# Patient Record
Sex: FEMALE | Race: WHITE | NOT HISPANIC OR LATINO | Employment: FULL TIME | URBAN - METROPOLITAN AREA
[De-identification: names, ages, dates, MRNs, and addresses within clinical notes are randomized per-mention and may not be internally consistent; named-entity substitution may affect disease eponyms.]

---

## 2019-06-19 ENCOUNTER — OFFICE VISIT (OUTPATIENT)
Dept: FAMILY MEDICINE | Facility: CLINIC | Age: 34
End: 2019-06-19
Payer: COMMERCIAL

## 2019-06-19 VITALS
HEART RATE: 106 BPM | OXYGEN SATURATION: 98 % | BODY MASS INDEX: 18.52 KG/M2 | HEIGHT: 67 IN | DIASTOLIC BLOOD PRESSURE: 79 MMHG | SYSTOLIC BLOOD PRESSURE: 117 MMHG | TEMPERATURE: 98.1 F | RESPIRATION RATE: 20 BRPM | WEIGHT: 118 LBS

## 2019-06-19 DIAGNOSIS — E03.4 HYPOTHYROIDISM DUE TO ACQUIRED ATROPHY OF THYROID: Primary | ICD-10-CM

## 2019-06-19 PROCEDURE — 99203 OFFICE O/P NEW LOW 30 MIN: CPT | Performed by: FAMILY MEDICINE

## 2019-06-19 RX ORDER — BUPROPION HYDROCHLORIDE 150 MG/1
150 TABLET ORAL EVERY MORNING
COMMUNITY
End: 2021-05-20

## 2019-06-19 RX ORDER — LEVOTHYROXINE SODIUM 75 UG/1
75 TABLET ORAL DAILY
COMMUNITY
End: 2019-06-19

## 2019-06-19 RX ORDER — LEVOTHYROXINE SODIUM 75 UG/1
75 TABLET ORAL DAILY
Qty: 90 TABLET | Refills: 3 | Status: SHIPPED | OUTPATIENT
Start: 2019-06-19 | End: 2020-06-08

## 2019-06-19 ASSESSMENT — PATIENT HEALTH QUESTIONNAIRE - PHQ9: SUM OF ALL RESPONSES TO PHQ QUESTIONS 1-9: 1

## 2019-06-19 ASSESSMENT — MIFFLIN-ST. JEOR: SCORE: 1259.93

## 2019-06-19 NOTE — PROGRESS NOTES
Subjective     Fara Mota is a 34 year old female who presents to clinic today for the following health issues:    HPI   New Patient/Transfer of Care  Medication Refills of Levothyroxine and Wellbutrin     Taking Medication as prescribed: yes    Side Effects:  Anxious--could it be related to medications or maybe just overall stress of moving     Medication Helping Symptoms:  Would like to discuss coming off of Wellbutrin      Has been living in [place]  Has had PP depression and has thyroid condition   needs refills    Had hyperthyroid initially then swung into hypo  Hs been on 50 mcg thyroid hormone and recently increased to 75  - last thyroid levels were may and ere at goal (roughly level of 2)    PHQ-9 SCORE 6/19/2019   PHQ-9 Total Score 1     Taking the levo for her mood as well  Thinks the buproprion is making her anxious it has helped with mood and depression  Has a network here feels this should help  She would like stop buproprion    UTD with other well adult screens        Patient Active Problem List   Diagnosis     CARDIOVASCULAR SCREENING; LDL GOAL LESS THAN 160     Postpartum depression     Hypothyroidism due to acquired atrophy of thyroid     No past surgical history on file.    Social History     Tobacco Use     Smoking status: Never Smoker     Smokeless tobacco: Never Used   Substance Use Topics     Alcohol use: Yes     Family History   Problem Relation Age of Onset     Hypertension Father      Cancer Maternal Grandfather      Diabetes Maternal Grandfather      Hypertension Sister          Current Outpatient Medications   Medication Sig Dispense Refill     buPROPion (WELLBUTRIN XL) 150 MG 24 hr tablet Take 150 mg by mouth every morning       levothyroxine (SYNTHROID/LEVOTHROID) 75 MCG tablet Take 1 tablet (75 mcg) by mouth daily 90 tablet 3     amoxicillin (AMOXIL) 500 MG capsule Take 1 capsule by mouth 3 times daily. (Patient not taking: Reported on 6/19/2019) 30 capsule 0         Reviewed and updated  "as needed this visit by Provider         Review of Systems   ROS COMP: Constitutional, HEENT, cardiovascular, pulmonary, gi and gu systems are negative, except as otherwise noted.      Objective    /79 (BP Location: Right arm, Patient Position: Sitting, Cuff Size: Adult Regular)   Pulse 106   Temp 98.1  F (36.7  C) (Oral)   Resp 20   Ht 1.689 m (5' 6.5\")   Wt 53.5 kg (118 lb)   SpO2 98%   Breastfeeding? No   BMI 18.76 kg/m    Body mass index is 18.76 kg/m .  Physical Exam   GENERAL: healthy, alert and no distress  NECK: no adenopathy, no asymmetry, masses, or scars and thyroid normal to palpation  CV: regular rate and rhythm, normal S1 S2, no S3 or S4, no murmur, click or rub, no peripheral edema and peripheral pulses strong  PSYCH: mentation appears normal, affect normal/bright    Diagnostic Test Results:  none         Assessment & Plan     (E03.4) Hypothyroidism due to acquired atrophy of thyroid  (primary encounter diagnosis)  Comment: refilled meds  XD Nutritionhck labs in a year  Plan: levothyroxine (SYNTHROID/LEVOTHROID) 75 MCG         tablet, TSH with free T4 reflex            (O99.345,  F53.0) Postpartum depression  Comment: encouraged she can stop wellbutrin if she would like - discussed that she can stop this quite easily - reviewed ways to do this  If symptoms return can consider another medication  Is sensitive to SAD - encouraged visit in mid fall to help fight this  Plan:        See Patient Instructions    No follow-ups on file.    Total time was over 30 minutes with >50% spent in counseling   Anna Marie Willis MD  [facility]        "

## 2019-06-19 NOTE — NURSING NOTE
"Chief Complaint   Patient presents with     Establish Care     Medication Request     /79 (BP Location: Right arm, Patient Position: Sitting, Cuff Size: Adult Regular)   Pulse 106   Temp 98.1  F (36.7  C) (Oral)   Resp 20   Ht 1.689 m (5' 6.5\")   Wt 53.5 kg (118 lb)   SpO2 98%   Breastfeeding? No   BMI 18.76 kg/m   Estimated body mass index is 18.76 kg/m  as calculated from the following:    Height as of this encounter: 1.689 m (5' 6.5\").    Weight as of this encounter: 53.5 kg (118 lb).  bp completed using cuff size: regular       Health Maintenance addressed:  Pap Smear    Completing KAREN.    Crow Alexis MA     "

## 2019-10-17 ENCOUNTER — TELEPHONE (OUTPATIENT)
Dept: FAMILY MEDICINE | Facility: CLINIC | Age: 34
End: 2019-10-17

## 2019-10-17 DIAGNOSIS — E03.4 HYPOTHYROIDISM DUE TO ACQUIRED ATROPHY OF THYROID: Primary | ICD-10-CM

## 2019-10-17 RX ORDER — LEVOTHYROXINE SODIUM 75 UG/1
75 TABLET ORAL DAILY
Qty: 4 TABLET | Refills: 0 | Status: SHIPPED | OUTPATIENT
Start: 2019-10-17 | End: 2020-05-30

## 2019-10-17 NOTE — TELEPHONE ENCOUNTER
Reason for Call:  Medication or medication refill:    Do you use a [facility]?  Name of the pharmacy and phone number for the current request:    [facility] in [place] if possible    Name of the medication requested:  levothyroxine (SYNTHROID/LEVOTHROID) 75 MCG tablet [[id number]] (Order [id number]  Other request:   Pt forgot to pack meds for vacation and would like a short term Rx sent so she can have meds. Trip is for 4 more days.     Can we leave a detailed message on this number? YES    Phone number patient can be reached at: Cell number on file:    Telephone Information:   Mobile [phone]       Best Time: any      Call taken on 10/17/2019 at 12:54 PM by Tyree Forrester

## 2019-10-17 NOTE — TELEPHONE ENCOUNTER
Patient informed.  Driving to [place].  Will leave [facility] on her voice mail per patient request.  Clara Adams RN

## 2019-11-09 ENCOUNTER — HEALTH MAINTENANCE LETTER (OUTPATIENT)
Age: 34
End: 2019-11-09

## 2020-01-13 ENCOUNTER — TELEPHONE (OUTPATIENT)
Dept: FAMILY MEDICINE | Facility: CLINIC | Age: 35
End: 2020-01-13

## 2020-01-13 NOTE — TELEPHONE ENCOUNTER
Reason for Call:  Medication or medication refill:    Do you use a [facility]?  Name of the pharmacy and phone number for the current request:     [id number] - [place], [place] - [facility] [address]      Name of the medication requested: Tamaflu    Other request:  and kids have the flu    Can we leave a detailed message on this number? YES    Phone number patient can be reached at: Home number on file [phone] (home)    Best Time: any    Call taken on 1/13/2020 at 3:57 PM by Thelma Gold    
Son has influenza B - diagnosed 1 week ago   just diagnosed with influenza B as well   Pt states she kisses her   Worried now that she may get flu  Pt doesn't have symptoms at this time  Not a lot of family support in [place]  She said if she gets the flu she isn't sure who will take care of the kids    Reviewed Tamiflu protocol   Patient is not high risk d/t dx, meds, etc  Advised monitoring for symptoms  If gets flu - symptomatic treatment    Pt ok with this plan  Andria BRYSON RN      
normal (ped)...

## 2020-02-23 ENCOUNTER — HEALTH MAINTENANCE LETTER (OUTPATIENT)
Age: 35
End: 2020-02-23

## 2020-03-16 ENCOUNTER — TELEPHONE (OUTPATIENT)
Dept: FAMILY MEDICINE | Facility: CLINIC | Age: 35
End: 2020-03-16

## 2020-03-16 NOTE — TELEPHONE ENCOUNTER
Reason for Call:  Same Day Appointment, Requested Provider:  Anna Marie Willis MD    PCP: Anna Marie Willis    Reason for visit: thyroid check    Duration of symptoms: na    Have you been treated for this in the past? Yes    Additional comments: pt wants to put off this visit due to covid and school cancellation but doesn't know how long she can do that/what would be appropriate    Can we leave a detailed message on this number? YES    Phone number patient can be reached at: Cell number on file:    Telephone Information:   Mobile [phone]       Best Time: any    Call taken on 3/16/2020 at 8:12 AM by Tyree Forrester

## 2020-03-16 NOTE — TELEPHONE ENCOUNTER
Last TSH 5/28/2019 - normal   Last OV 6/19/2019    Left detailed VM for pt   Schedule appt for June 2020 and hopefully things better by then re: Covid 19    If needs refill call to pharmacy or call us back  Andria BRYSON RN

## 2020-04-15 ENCOUNTER — VIRTUAL VISIT (OUTPATIENT)
Dept: FAMILY MEDICINE | Facility: OTHER | Age: 35
End: 2020-04-15

## 2020-04-15 NOTE — PROGRESS NOTES
"Date: 04/15/2020 10:00:43  Clinician: Anna Marie Otto  Clinician NPI: [id number]  Patient: Fara Darling  Patient : 1985  Patient Address: [address] [place] [address]  Patient Phone: [phone]  Visit Protocol: URI  Patient Summary:  Fara is a 35 year old ( : 1985 ) female who initiated a Visit for COVID-19 (Coronavirus) evaluation and screening. When asked the question \"Please sign me up to receive news, health information and promotions. \", Fara responded \"No\".    Fara states her symptoms started 1-2 days ago.   Her symptoms consist of a sore throat, a cough, and malaise.   Symptom details     Cough: Fara coughs a few times an hour and her cough is not more bothersome at night. Phlegm comes into her throat when she coughs. She does not believe her cough is caused by post-nasal drip. The color of the phlegm is white.     Sore throat: Fara reports having mild throat pain (1-3 on a 10 point pain scale), does not have exudate on her tonsils, and can swallow liquids. The lymph nodes in her neck are not enlarged. A rash has not appeared on the skin since the sore throat started.      Fara denies having rhinitis, enlarged lymph nodes, chills, diarrhea, facial pain or pressure, myalgias, vomiting, nausea, teeth pain, headache, wheezing, nasal congestion, ear pain, and fever. She also denies taking antibiotic medication for the symptoms and having recent facial or sinus surgery in the past 60 days. She is not experiencing dyspnea.   Precipitating events  Within the past week, Fara has not been exposed to someone with strep throat. She has not recently been exposed to someone with influenza. Fara has been in close contact with the following high risk individuals: children under the age of 5.   Pertinent COVID-19 (Coronavirus) information  Fara has not traveled internationally or to the areas where COVID-19 (Coronavirus) is widespread, including cruise ship travel in the last " 14 days before the start of her symptoms.   Fara does not work or volunteer as healthcare worker or a  and does not work or volunteer in a healthcare facility.   She does not live with a healthcare worker.   Fara has not had a close contact with a laboratory-confirmed COVID-19 patient within 14 days of symptom onset. She also has not had a close contact with a suspected COVID-19 patient within 14 days of symptom onset.   Pertinent medical history  Fara does not get yeast infections when she takes antibiotics.   Fara needs a return to work/school note.   Weight: 130 lbs   Fara does not smoke or use smokeless tobacco.   She denies pregnancy and denies breastfeeding. She has menstruated in the past month.   Additional information as reported by the patient (free text): chest is tight.  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 This illness can cause fever, cough and trouble breathing. Many people get a mild case and get better on their own. Some people can get very sick. At this time, it is difficult to distinguish between COVID-19 and the common cold given your symptoms. You can continue to perform supportive cares by using Robitussin or Mucinex to help with the cough. Also, you can use honey in warm tea to help soothe your throat. I will sign you up for the [employer] Loop and they will check in on you daily.&nbsp;  Will I be tested for COVID-19?  Because the virus is spreading, we are no longer testing most patients. You may request testing if:   You are very ill. For example, you're on chemotherapy, dialysis or home hospice care. (Contact your specialty clinic or program.)   You live in a nursing home or other long-term care facility. (Talk to your nurse manager or medical  director.)   You're a health care worker. (Contact your employee health office.)   How can I protect others?  Without a test, we can't know for sure that you have COVID-19. For safety, it's very important to follow these rules.  First, stay home and away from others (self-isolate) until:   You've had no fever---and no medicine that reduces fever---for 3 full days (72 hours). And...    Your other symptoms have gotten better. For example, your cough or breathing has improved. And...   At least 7 days have passed since your symptoms started.   During this time:   Don't go to work, school or anywhere else.    Stay away from others in your home. No hugging, kissing or shaking hands.   Don't let anyone visit.   Cover your mouth and nose with a mask, tissue or wash cloth to avoid spreading germs.   Wash your hands and face often. Use soap and water.   How can I take care of myself?   1.Take Tylenol (acetaminophen) for fever or pain. If you have liver or kidney problems, ask your family doctor if it's okay to take Tylenol.        Adults can take either:    650 mg (two 325 mg pills) every 4 to 6 hours, or...   1,000 mg (two 500 mg pills) every 8 hours as needed.    Note: Don't take more than 3,000 mg in one day.   For children, check the Tylenol bottle for the right dose. The dose is based on the child's age or weight.   2.If you have other health problems (like cancer, heart failure, an organ transplant or severe kidney disease): Call your specialty clinic if you don't feel better in the next 2 days.       3.Know when to call 911: If your breathing is so bad that it keeps you from doing normal activities, call 911 or go to the emergency room. Tell them that you've been staying home and may have COVID-19.       4.Sign up for [employer]. We know it's scary to hear that you might have COVID-19. We want to track your symptoms to make sure you're okay over the next 2 weeks. Please look for an email from [employer]---this is  a free, online program that we'll use to keep in touch. To sign up, follow the link in the email. Learn more at [email or web address].   Where can I get more information?  To learn more about COVID-19 and how to care for yourself at home, please visit the CDC website at https://www.cdc.gov/coronavirus/2019-ncov/about/steps-when-sick.html.  For more options for care at [facility], please visit our website at [email or web address]     Diagnosis: Cough  Diagnosis ICD: R05  Addendum created: April 15 12:10:22, 2020 created by: Raoul Ferreira body: I reviewed the e-visit and discussed the patient with the resident and agree with the resident's assessment and plan of care as documented.

## 2020-05-29 DIAGNOSIS — E03.4 HYPOTHYROIDISM DUE TO ACQUIRED ATROPHY OF THYROID: ICD-10-CM

## 2020-05-30 RX ORDER — LEVOTHYROXINE SODIUM 75 UG/1
TABLET ORAL
Qty: 7 TABLET | Refills: 0 | Status: SHIPPED | OUTPATIENT
Start: 2020-05-30 | End: 2021-05-20

## 2020-05-30 NOTE — TELEPHONE ENCOUNTER
Clinic Action Needed:No  Reason for Call: Fara is on her way to [place], [place] and she left her levothyroxine at home.  She is requesting a 4 day fill on her medications.  Called [facility] in [place], [place] and they will be able to fill from Rx sent to [facility] in [place], [place] on [address].    Paged on call provider for [facility] to speak to me at [facility].  Dr. Ontiveros is on call.  He gave verbal for 7 day supply.  Ordered Levothyroxine 75 mcg tablet, take one by mouth daily.  7 pills, no refills and sent to [facility] in [place] on [address], with instructions to hold Rx, that [facility] will fill.      Notified caller that Rx was sent to local pharmacy, have [facility] fill from that Rx.    Routed to: Not routed.    Reina Romero RN  [facility]

## 2020-06-05 DIAGNOSIS — E03.4 HYPOTHYROIDISM DUE TO ACQUIRED ATROPHY OF THYROID: ICD-10-CM

## 2020-06-05 NOTE — TELEPHONE ENCOUNTER
"Requested Prescriptions   Pending Prescriptions Disp Refills     levothyroxine (SYNTHROID/LEVOTHROID) 75 MCG tablet  Last Written Prescription Date:  5/30/2020  Last Fill Quantity: 7 tablet,  # refills: 0   Last office visit: 6/19/2019 with prescribing provider:  Lazaro   Future Office Visit:     90 tablet 3     Sig: Take 1 tablet (75 mcg) by mouth daily       Thyroid Protocol Failed - 6/5/2020  2:45 PM        Failed - Normal TSH on file in past 12 months     No lab results found.           Passed - Patient is 12 years or older        Passed - Recent (12 mo) or future (30 days) visit within the authorizing provider's specialty     Patient has had an office visit with the authorizing provider or a provider within the authorizing providers department within the previous 12 mos or has a future within next 30 days. See \"Patient Info\" tab in inbasket, or \"Choose Columns\" in Meds & Orders section of the refill encounter.              Passed - Medication is active on med list        Passed - No active pregnancy on record     If patient is pregnant or has had a positive pregnancy test, please check TSH.          Passed - No positive pregnancy test in past 12 months     If patient is pregnant or has had a positive pregnancy test, please check TSH.                "

## 2020-06-08 RX ORDER — LEVOTHYROXINE SODIUM 75 UG/1
75 TABLET ORAL DAILY
Qty: 30 TABLET | Refills: 0 | Status: SHIPPED | OUTPATIENT
Start: 2020-06-08 | End: 2020-07-26

## 2020-06-08 NOTE — TELEPHONE ENCOUNTER
Medication is being filled for 1 time refill only due to:  due for virtual visit this month   Andria BRYSON RN

## 2020-07-10 DIAGNOSIS — Z11.59 SCREENING FOR VIRAL DISEASE: ICD-10-CM

## 2020-07-16 ENCOUNTER — OFFICE VISIT (OUTPATIENT)
Dept: FAMILY MEDICINE | Facility: CLINIC | Age: 35
End: 2020-07-16
Payer: COMMERCIAL

## 2020-07-16 VITALS
OXYGEN SATURATION: 98 % | HEART RATE: 85 BPM | DIASTOLIC BLOOD PRESSURE: 81 MMHG | TEMPERATURE: 98.4 F | WEIGHT: 115.1 LBS | BODY MASS INDEX: 18.3 KG/M2 | SYSTOLIC BLOOD PRESSURE: 115 MMHG

## 2020-07-16 DIAGNOSIS — L70.0 ACNE VULGARIS: ICD-10-CM

## 2020-07-16 DIAGNOSIS — Z20.822 SUSPECTED COVID-19 VIRUS INFECTION: ICD-10-CM

## 2020-07-16 DIAGNOSIS — E03.4 HYPOTHYROIDISM DUE TO ACQUIRED ATROPHY OF THYROID: Primary | ICD-10-CM

## 2020-07-16 PROCEDURE — 84443 ASSAY THYROID STIM HORMONE: CPT | Performed by: FAMILY MEDICINE

## 2020-07-16 PROCEDURE — 99214 OFFICE O/P EST MOD 30 MIN: CPT | Performed by: FAMILY MEDICINE

## 2020-07-16 PROCEDURE — 36415 COLL VENOUS BLD VENIPUNCTURE: CPT | Performed by: FAMILY MEDICINE

## 2020-07-16 RX ORDER — CLINDAMYCIN PHOSPHATE 11.9 MG/ML
SOLUTION TOPICAL 2 TIMES DAILY
Qty: 60 ML | Refills: 3 | Status: SHIPPED | OUTPATIENT
Start: 2020-07-16 | End: 2021-04-15

## 2020-07-16 RX ORDER — ADAPALENE 0.1 G/100G
CREAM TOPICAL AT BEDTIME
Qty: 45 G | Refills: 3 | Status: SHIPPED | OUTPATIENT
Start: 2020-07-16

## 2020-07-16 NOTE — PROGRESS NOTES
Subjective     Fara Darling is a 35 year old female who presents to clinic today for the following health issues:    HPI       Hypothyroidism Follow-up    Since last visit, patient describes the following symptoms: weight loss of 12 lbs and hair loss, patient did just start new diet    How many servings of fruits and vegetables do you eat daily?  4 or more    On average, how many sweetened beverages do you drink each day (Examples: soda, juice, sweet tea, etc.  Do NOT count diet or artificially sweetened beverages)?   0    How many days per week do you exercise enough to make your heart beat faster? 3 or less    How many minutes a day do you exercise enough to make your heart beat faster? 30 - 60    How many days per week do you miss taking your medication? 0        Patient Active Problem List   Diagnosis     CARDIOVASCULAR SCREENING; LDL GOAL LESS THAN 160     Postpartum depression     Hypothyroidism due to acquired atrophy of thyroid     History reviewed. No pertinent surgical history.    Social History     Tobacco Use     Smoking status: Never Smoker     Smokeless tobacco: Never Used   Substance Use Topics     Alcohol use: Yes     Family History   Problem Relation Age of Onset     Hypertension Father      Cancer Maternal Grandfather      Diabetes Maternal Grandfather      Hypertension Sister          Current Outpatient Medications   Medication Sig Dispense Refill     adapalene (DIFFERIN) 0.1 % external cream Apply topically At Bedtime 45 g 3     buPROPion (WELLBUTRIN XL) 150 MG 24 hr tablet Take 150 mg by mouth every morning       clindamycin (CLEOCIN T) 1 % external solution Apply topically 2 times daily 60 mL 3     levothyroxine (SYNTHROID/LEVOTHROID) 75 MCG tablet TAKE 1 TABLET BY MOUTH EVERY DAY 7 tablet 0     levothyroxine (SYNTHROID/LEVOTHROID) 75 MCG tablet Take 1 tablet (75 mcg) by mouth daily 30 tablet 0       Reviewed and updated as needed this visit by Provider  Tobacco  Allergies  Meds   Problems  Med Hx  Surg Hx  Fam Hx       Has lost weight  denies palpitations  Heat intolerance    Changed her diet and lost 12 # since April  Quinoa vegetavlesnd fruit    Was hoping to stop her acne  Has not had any meds for this  Has not seen derm or pCP  Has had acne intermittenly last several years    Review of Systems   Constitutional, HEENT, cardiovascular, pulmonary, gi and gu systems are negative, except as otherwise noted.      Objective    /81   Pulse 85   Temp 98.4  F (36.9  C) (Oral)   Wt 52.2 kg (115 lb 1.6 oz)   LMP 06/19/2020   SpO2 98%   BMI 18.30 kg/m    Body mass index is 18.3 kg/m .  Physical Exam   GENERAL: healthy, alert and no distress  NECK: no adenopathy, no asymmetry, masses, or scars and thyroid normal to palpation  RESP: lungs clear to auscultation - no rales, rhonchi or wheezes  CV: regular rate and rhythm, normal S1 S2, no S3 or S4, no murmur, click or rub, no peripheral edema and peripheral pulses strong  SKIN: scattered acne along chin  Diagnostic Test Results:  labs pending        Assessment & Plan     1. Suspected COVID-19 virus infection  Plans for this in future  - COVID-19 Virus (Coronavirus) Antibody & Titer Reflex; Future    2. Acne vulgaris  Trial of medications as noted  - clindamycin (CLEOCIN T) 1 % external solution; Apply topically 2 times daily  Dispense: 60 mL; Refill: 3  - adapalene (DIFFERIN) 0.1 % external cream; Apply topically At Bedtime  Dispense: 45 g; Refill: 3    3. Hypothyroidism due to acquired atrophy of thyroid  recheck labs given weight loss  - TSH with free T4 reflex       See Patient Instructions    No follow-ups on file.    Anna Marie Willis MD  [facility]

## 2020-07-17 LAB — TSH SERPL DL<=0.005 MIU/L-ACNC: 1.5 MU/L (ref 0.4–4)

## 2020-07-18 DIAGNOSIS — Z11.59 SCREENING FOR VIRAL DISEASE: ICD-10-CM

## 2020-07-18 DIAGNOSIS — Z20.822 SUSPECTED COVID-19 VIRUS INFECTION: ICD-10-CM

## 2020-07-18 PROCEDURE — 86769 SARS-COV-2 COVID-19 ANTIBODY: CPT | Mod: 90 | Performed by: FAMILY MEDICINE

## 2020-07-18 PROCEDURE — 99000 SPECIMEN HANDLING OFFICE-LAB: CPT | Performed by: FAMILY MEDICINE

## 2020-07-18 PROCEDURE — 36415 COLL VENOUS BLD VENIPUNCTURE: CPT | Performed by: FAMILY MEDICINE

## 2020-07-18 NOTE — LETTER
July 22, 2020        Fara Darling  [address]  [facility] [address]      COVID-19 Antibody Screen   Date Value Ref Range Status   07/18/2020 Negative  Final     Comment:     No COVID-19 antibodies detected.  Patients within 10 days of symptom onset for   COVID-19 may not produce sufficient levels of detectable antibodies.    Immunocompromised COVID-19 patients may take longer to develop antibodies.       COVID-19 Antibody, IgG Titer   Date Value Ref Range Status   07/18/2020 Not Applicable  Final     Comment:     Qualitative screen for total antibodies to COVID-19 (SARS-CoV-2) with   semi-quantitative measurement of IgG COVID-19 antibodies by endpoint titer.    COVID-19 antibodies may be elevated due to a past or current infection.  Negative results do not rule out COVID-19 infection.  Results from antibody   testing should not be used as the sole basis to diagnose or exclude SARS-CoV-2   infection or to inform infection status.  COVID-19 PCR test should be ordered   if current infection is suspected.  False positive results may occur in rare   cases due to cross-reacting antibodies.  This test was developed and its performance characteristics determined by the   [facility] ([facility]),   which is regulated under CLIA as qualified to perform high-complexity testing.    This test has not been reviewed by the FDA.  Testing performed by [facility], [facility], [address], [address], [place] [address]         No results found for: COVAB      You have tested NEGATIVE for COVID-19 antibodies. This suggests you have not had or been exposed to COVID-19. But it does not mean that for sure.     The test finds antibodies in most people 10 days after they get sick. For some people, it takes longer than 10 days for antibodies to show up. Others may never show antibodies against COVID-19, especially if they  have weak immune systems.    If you have COVID-19 symptoms now, please stay home and away from others.     What is antibody testing?    This is a kind of blood test. We take a small sample of your blood, and then test it for something called  antibodies.      Your body makes antibodies to fight infection. If your blood has antibodies for a certain germ, it means you ve been infected with that germ in the past.     Sometimes, antibodies stay in your body for years after you ve had the infection. They can be there even if the germ didn t make you sick. They are a sign that your body fought off the infection.    Will this test find antibodies in everyone who s had COVID-19?    No. The test finds antibodies in most people 10 days after they get sick. For some people, it takes longer than 10 days for antibodies to show up. Others may never show antibodies against COVID-19, especially if they have weak immune systems.    What does it mean if the test finds COVID-19 antibodies?    If we find these antibodies, it suggests:     This person has had the virus.     Their body s immune system fought the virus.     We don t know if this will help protect someone from getting COVID-19 again. Scientists are still learning about this.    What are the signs of COVID-19?    Signs of COVID-19 can appear from 2 to 14 days (up to 2 weeks) after you re infected. Some people have no symptoms or only mild symptoms. Others get very sick. The most common symptoms are:          Cough    Shortness of breath or trouble breathing  Or at least 2 of these symptoms:    Fever    Chills    Repeated shaking with chills    Muscle pain    Headache    Sore throat    Losing your sense of taste or smell    You may have other symptoms. Please contact your doctor or clinic for any symptoms that worry you.    Where can I get more information?     To learn the [place] s guidelines for staying home, please visit the [facility] website at  https://www.health.state.[place]/diseases/coronavirus/basics.html    To learn more about COVID-19 and how to care for yourself at home, please visit the CDC website at https://www.cdc.gov/coronavirus/2019-ncov/about/steps-when-sick.html    For more options for care at [facility], please visit our website at [email or web address]    [place] [facility] ([facility]) COVID-19 Hotline:  [phone]

## 2020-07-21 LAB
COVID-19 SPIKE RBD ABY TITER: NORMAL
COVID-19 SPIKE RBD ABY: NEGATIVE

## 2020-07-26 DIAGNOSIS — E03.4 HYPOTHYROIDISM DUE TO ACQUIRED ATROPHY OF THYROID: ICD-10-CM

## 2020-07-26 RX ORDER — LEVOTHYROXINE SODIUM 75 UG/1
75 TABLET ORAL DAILY
Qty: 90 TABLET | Refills: 3 | Status: SHIPPED | OUTPATIENT
Start: 2020-07-26 | End: 2021-05-20

## 2020-07-29 ENCOUNTER — MYC MEDICAL ADVICE (OUTPATIENT)
Dept: FAMILY MEDICINE | Facility: CLINIC | Age: 35
End: 2020-07-29

## 2020-12-06 ENCOUNTER — HEALTH MAINTENANCE LETTER (OUTPATIENT)
Age: 35
End: 2020-12-06

## 2021-04-15 ENCOUNTER — OFFICE VISIT (OUTPATIENT)
Dept: DERMATOLOGY | Facility: CLINIC | Age: 36
End: 2021-04-15
Payer: COMMERCIAL

## 2021-04-15 DIAGNOSIS — Z12.83 SKIN CANCER SCREENING: Primary | ICD-10-CM

## 2021-04-15 DIAGNOSIS — D48.5 NEOPLASM OF UNCERTAIN BEHAVIOR OF SKIN: ICD-10-CM

## 2021-04-15 DIAGNOSIS — D22.9 MULTIPLE BENIGN NEVI: ICD-10-CM

## 2021-04-15 DIAGNOSIS — L70.0 ACNE VULGARIS: ICD-10-CM

## 2021-04-15 PROCEDURE — 99203 OFFICE O/P NEW LOW 30 MIN: CPT | Mod: 25 | Performed by: PHYSICIAN ASSISTANT

## 2021-04-15 PROCEDURE — 11102 TANGNTL BX SKIN SINGLE LES: CPT | Performed by: PHYSICIAN ASSISTANT

## 2021-04-15 PROCEDURE — 88305 TISSUE EXAM BY PATHOLOGIST: CPT | Performed by: DERMATOLOGY

## 2021-04-15 RX ORDER — LIDOCAINE HYDROCHLORIDE AND EPINEPHRINE 10; 10 MG/ML; UG/ML
3 INJECTION, SOLUTION INFILTRATION; PERINEURAL ONCE
Status: ACTIVE | OUTPATIENT
Start: 2021-04-15

## 2021-04-15 RX ORDER — DAPSONE 75 MG/G
GEL TOPICAL DAILY
Qty: 90 G | Refills: 1 | Status: SHIPPED | OUTPATIENT
Start: 2021-04-15

## 2021-04-15 ASSESSMENT — PAIN SCALES - GENERAL: PAINLEVEL: NO PAIN (0)

## 2021-04-15 NOTE — NURSING NOTE
Dermatology Rooming Note    Kaitlyn Darling's goals for this visit include:   Chief Complaint   Patient presents with     Skin Check     kaitlyn is coming in today for a skin check, states that her last check was 4 years ago, states that she has no spots of concern today, would also like to discuss acne, states it has gotten worse     Carolann Campuzano CMA on 4/15/2021 at 11:52 AM

## 2021-04-15 NOTE — PROGRESS NOTES
[facility] Dermatology Note  Encounter Date: Apr 15, 2021  Office Visit      Dermatology Problem List:  1. Acne - adapalene 0.1% cream nightly, Aczone 7.5% gel QAM  2. NUB - R medial calf - s/p bx 4/15/21    Social:  Two children. Grew up in [place], spent a lot of time in sun, recalls numerous sunburns.  ____________________________________________    Assessment & Plan:  # Skin Cancer Screening?multiple Benign Nevi.   - ABCDEs: Counseled ABCDEs of melanoma: Asymmetry, Border (irregularity), Color (not uniform, changes in color), Diameter (greater than 6 mm which is about the size of a pencil eraser), and Evolving (any changes in preexisting moles).  - Monitor: Patient to continue monitoring at home and will contact the clinic for any changes.  - Sun protection: Counseled SPF30+ sunscreen, UPF clothing, sun avoidance, tanning bed avoidance.     # Acne vulgaris. Likely hormonal component, but wants to avoid oral medication right now  - continue adapalene 0.1% cream nightly  - start dapsone 7.5% gel QAM    # Neoplasm of uncertain behavior on the R medial calf. The differential diagnosis includes DN vs other.   - Shave biopsy performed today (see procedure note(s) below).     Procedures Performed:   - Shave biopsy procedure note, location(s): R medial calf. After discussion of benefits and risks including but not limited to bleeding, infection, scar, incomplete removal, recurrence, and non-diagnostic biopsy, written consent and photographs were obtained. The area was cleaned with isopropyl alcohol. 0.5mL of 1% lidocaine with epinephrine was injected to obtain adequate anesthesia of lesion(s). Shave biopsy at site(s) performed. Hemostasis was achieved with aluminium chloride. Petrolatum ointment and a sterile dressing were applied. The patient tolerated the procedure and no complications were noted. The patient was provided with verbal and written post care instructions.      Follow-up: 1 year(s)  in-person, or earlier for new or changing lesions    Staff:     All risks, benefits and alternatives were discussed with patient.  Patient is in agreement and understands the assessment and plan.  All questions were answered.    Caryl Arreola PA-C, MPAS  [facility] [place]: Phone: [phone], Fax: [phone]  [facility]: Phone: [phone],  Fax: [phone]  ____________________________________________    CC: Skin Check (kaitlyn is coming in today for a skin check, states that her last check was 4 years ago, states that she has no spots of concern today, would also like to discuss acne, states it has gotten worse)    HPI:  Ms. Kaitlyn Darling is a 36 year old female who presents today as a new patient for a skin cancer. No personal or fhx melanoma.     Also notes acne. Uses adapalene at night. Cleanses BID. Uses daily sunscreen and moisturizer. Skin seems to get worse in winter. Not on contraceptives. Has had biopsies of moles in the past, none were atypical.      Patient is otherwise feeling well, without additional concerns.    Labs:  Reviewed path reports from [facility] from 2015 - benign nevi    Physical Exam:  Vitals: There were no vitals taken for this visit.  SKIN: Full skin, which includes the head/face, both arms, chest, back, abdomen,both legs, genitalia and/or groin buttocks, digits and/or nails, was examined.   - 3mm dark brown and black irregular macule on the R medial calf  - There are superifical acneiform papules with intermixed open and closed comedones on the face.   - Multiple regular brown pigmented macules and papules are identified on the trunk and extremities, greater than 100   -Bales's skin type I   - No other lesions of concern on areas examined.     Medications:  Current Outpatient Medications   Medication     levothyroxine (SYNTHROID/LEVOTHROID) 75 MCG tablet     adapalene (DIFFERIN)  0.1 % external cream     buPROPion (WELLBUTRIN XL) 150 MG 24 hr tablet     clindamycin (CLEOCIN T) 1 % external solution     levothyroxine (SYNTHROID/LEVOTHROID) 75 MCG tablet     No current facility-administered medications for this visit.       Past Medical/Surgical History:   Patient Active Problem List   Diagnosis     CARDIOVASCULAR SCREENING; LDL GOAL LESS THAN 160     Postpartum depression     Hypothyroidism due to acquired atrophy of thyroid     History reviewed. No pertinent past medical history.    CC Dr. Stanford on close of this encounter.

## 2021-04-15 NOTE — LETTER
4/15/2021       RE: Fara Darling  [address] [place] [address]     Dear Colleague,    Thank you for referring your patient, Fara Darling, to the [facility] [place] at [facility]. Please see a copy of my visit note below.    [facility] Dermatology Note  Encounter Date: Apr 15, 2021  Office Visit      Dermatology Problem List:  1. Acne - adapalene 0.1% cream nightly, Aczone 7.5% gel QAM  2. NUB - R medial calf - s/p bx 4/15/21    Social:  Two children. Grew up in [place], spent a lot of time in sun, recalls numerous sunburns.  ____________________________________________    Assessment & Plan:  # Skin Cancer Screening?multiple Benign Nevi.   - ABCDEs: Counseled ABCDEs of melanoma: Asymmetry, Border (irregularity), Color (not uniform, changes in color), Diameter (greater than 6 mm which is about the size of a pencil eraser), and Evolving (any changes in preexisting moles).  - Monitor: Patient to continue monitoring at home and will contact the clinic for any changes.  - Sun protection: Counseled SPF30+ sunscreen, UPF clothing, sun avoidance, tanning bed avoidance.     # Acne vulgaris. Likely hormonal component, but wants to avoid oral medication right now  - continue adapalene 0.1% cream nightly  - start dapsone 7.5% gel QAM    # Neoplasm of uncertain behavior on the R medial calf. The differential diagnosis includes DN vs other.   - Shave biopsy performed today (see procedure note(s) below).     Procedures Performed:   - Shave biopsy procedure note, location(s): R medial calf. After discussion of benefits and risks including but not limited to bleeding, infection, scar, incomplete removal, recurrence, and non-diagnostic biopsy, written consent and photographs were obtained. The area was cleaned with isopropyl alcohol. 0.5mL of 1% lidocaine with epinephrine was injected to obtain adequate anesthesia of  lesion(s). Shave biopsy at site(s) performed. Hemostasis was achieved with aluminium chloride. Petrolatum ointment and a sterile dressing were applied. The patient tolerated the procedure and no complications were noted. The patient was provided with verbal and written post care instructions.      Follow-up: 1 year(s) in-person, or earlier for new or changing lesions    Staff:     All risks, benefits and alternatives were discussed with patient.  Patient is in agreement and understands the assessment and plan.  All questions were answered.    Caryl Arreola PA-C, MPAS  [facility]: Phone: [phone], Fax: [phone]  [facility]: Phone: [phone],  Fax: [phone]  ____________________________________________    CC: Skin Check (kaitlyn is coming in today for a skin check, states that her last check was 4 years ago, states that she has no spots of concern today, would also like to discuss acne, states it has gotten worse)    HPI:  Ms. Kaitlyn Darling is a 36 year old female who presents today as a new patient for a skin cancer. No personal or fhx melanoma.     Also notes acne. Uses adapalene at night. Cleanses BID. Uses daily sunscreen and moisturizer. Skin seems to get worse in winter. Not on contraceptives. Has had biopsies of moles in the past, none were atypical.      Patient is otherwise feeling well, without additional concerns.    Labs:  Reviewed path reports from [facility] from 2015 - benign nevi    Physical Exam:  Vitals: There were no vitals taken for this visit.  SKIN: Full skin, which includes the head/face, both arms, chest, back, abdomen,both legs, genitalia and/or groin buttocks, digits and/or nails, was examined.   - 3mm dark brown and black irregular macule on the R medial calf  - There are superifical acneiform papules with intermixed open and closed comedones on the face.   - Multiple regular  brown pigmented macules and papules are identified on the trunk and extremities, greater than 100   -Bales's skin type I   - No other lesions of concern on areas examined.     Medications:  Current Outpatient Medications   Medication     levothyroxine (SYNTHROID/LEVOTHROID) 75 MCG tablet     adapalene (DIFFERIN) 0.1 % external cream     buPROPion (WELLBUTRIN XL) 150 MG 24 hr tablet     clindamycin (CLEOCIN T) 1 % external solution     levothyroxine (SYNTHROID/LEVOTHROID) 75 MCG tablet     No current facility-administered medications for this visit.       Past Medical/Surgical History:   Patient Active Problem List   Diagnosis     CARDIOVASCULAR SCREENING; LDL GOAL LESS THAN 160     Postpartum depression     Hypothyroidism due to acquired atrophy of thyroid     History reviewed. No pertinent past medical history.    CC Dr. Stanford on close of this encounter.

## 2021-04-15 NOTE — NURSING NOTE
Lidocaine-epinephrine 1-1:951083 % injection   1mL once for one use, starting 4/15/2021 ending 4/15/2021,  2mL disp, R-0, injection  Injected by Carolann Campuzano CMA

## 2021-04-15 NOTE — PATIENT INSTRUCTIONS

## 2021-04-18 LAB — COPATH REPORT: NORMAL

## 2021-04-23 ENCOUNTER — MYC MEDICAL ADVICE (OUTPATIENT)
Dept: FAMILY MEDICINE | Facility: CLINIC | Age: 36
End: 2021-04-23

## 2021-04-26 NOTE — TELEPHONE ENCOUNTER
SN,    Please see Echo360 message below.  Patient scheduled for physical with you 5/20    Do you want her to come to the appointment fasting.  Clara Adams RN

## 2021-05-18 NOTE — PROGRESS NOTES
SUBJECTIVE:   CC: Fara Darling is an 36 year old woman who presents for preventive health visit.       Patient has been advised of split billing requirements and indicates understanding: Yes  Healthy Habits:     Getting at least 3 servings of Calcium per day:  Yes    Bi-annual eye exam:  NO    Dental care twice a year:  NO    Sleep apnea or symptoms of sleep apnea:  None    Diet:  Gluten-free/reduced and Other    Frequency of exercise:  None    Taking medications regularly:  Yes    Medication side effects:  None    PHQ-2 Total Score: 0    Additional concerns today:  Yes     Has noticed with being slightly cold - her nailbeds are numb along fingers    Feet hurt - constant all day long      Increased anxiety - not much time to herself  Her kids are intense  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    Today's PHQ-2 Score:   PHQ-2 ( 1999 Pfizer) 5/20/2021   Q1: Little interest or pleasure in doing things 0   Q2: Feeling down, depressed or hopeless 0   PHQ-2 Score 0   Q1: Little interest or pleasure in doing things Not at all   Q2: Feeling down, depressed or hopeless Not at all   PHQ-2 Score 0       Abuse: Current or Past (Physical, Sexual or Emotional) - No  Do you feel safe in your environment? Yes    Have you ever done Advance Care Planning? (For example, a Health Directive, POLST, or a discussion with a medical provider or your loved ones about your wishes): No, advance care planning information given to patient to review.  Patient declined advance care planning discussion at this time.    Social History     Tobacco Use     Smoking status: Never Smoker     Smokeless tobacco: Never Used   Substance Use Topics     Alcohol use: Yes     If you drink alcohol do  you typically have >3 drinks per day or >7 drinks per week? No    Alcohol Use 5/20/2021   Prescreen: >3 drinks/day or >7 drinks/week? Yes   Prescreen: >3 drinks/day or >7 drinks/week? -   AUDIT SCORE  9     AUDIT - Alcohol Use Disorders Identification Test - Reproduced from the World Health Organization Audit 2001 (Second Edition) 5/20/2021   1.  How often do you have a drink containing alcohol? 4 or more times a week   2.  How many drinks containing alcohol do you have on a typical day when you are drinking? 1 or 2   3.  How often do you have five or more drinks on one occasion? Never   4.  How often during the last year have you found that you were not able to stop drinking once you had started? Never   5.  How often during the last year have you failed to do what was normally expected of you because of drinking? Never   6.  How often during the last year have you needed a first drink in the morning to get yourself going after a heavy drinking session? Never   7.  How often during the last year have you had a feeling of guilt or remorse after drinking? Less than monthly   8.  How often during the last year have you been unable to remember what happened the night before because of your drinking? Never   9.  Have you or someone else been injured because of your drinking? No   10. Has a relative, friend, doctor or other health care worker been concerned about your drinking or suggested you cut down? Yes, during the last year   TOTAL SCORE 9       Reviewed orders with patient.  Reviewed health maintenance and updated orders accordingly - Yes  Labs reviewed in EPIC    Breast Cancer Screening:  Any new diagnosis of family breast, ovarian, or bowel cancer? No    FSH-7: No flowsheet data found.    Patient under 40 years of age: Routine Mammogram Screening not recommended.   Pertinent mammograms are reviewed under the imaging tab.    History of abnormal Pap smear: NO - age 30-65 PAP every 5 years with negative HPV  "co-testing recommended     Reviewed and updated as needed this visit by clinical staff  Tobacco  Allergies  Meds  Problems             Reviewed and updated as needed this visit by Provider    Meds  Problems            No past medical history on file.   No past surgical history on file.    Review of Systems  CONSTITUTIONAL: NEGATIVE for fever, chills, change in weight  INTEGUMENTARU/SKIN: NEGATIVE for worrisome rashes, moles or lesions  EYES: NEGATIVE for vision changes or irritation  ENT: NEGATIVE for ear, mouth and throat problems  RESP: NEGATIVE for significant cough or SOB  BREAST: NEGATIVE for masses, tenderness or discharge  CV: NEGATIVE for chest pain, palpitations or peripheral edema  GI: NEGATIVE for nausea, abdominal pain, heartburn, or change in bowel habits  : NEGATIVE for unusual urinary or vaginal symptoms. Periods are regular.  MUSCULOSKELETAL: NEGATIVE for significant arthralgias or myalgia  NEURO: NEGATIVE for weakness, dizziness or paresthesias  PSYCHIATRIC: NEGATIVE for changes in mood or affect     OBJECTIVE:   /88   Pulse 89   Temp 98.9  F (37.2  C) (Tympanic)   Resp 16   Ht 1.689 m (5' 6.5\")   Wt 51.7 kg (114 lb)   SpO2 100%   BMI 18.12 kg/m    Physical Exam  GENERAL: healthy, alert and no distress  EYES: Eyes grossly normal to inspection, PERRL and conjunctivae and sclerae normal  HENT: ear canals and TM's normal, nose and mouth without ulcers or lesions  NECK: no adenopathy, no asymmetry, masses, or scars and thyroid normal to palpation  RESP: lungs clear to auscultation - no rales, rhonchi or wheezes  BREAST: normal without masses, tenderness or nipple discharge and no palpable axillary masses or adenopathy  CV: regular rate and rhythm, normal S1 S2, no S3 or S4, no murmur, click or rub, no peripheral edema and peripheral pulses strong  ABDOMEN: soft, nontender, no hepatosplenomegaly, no masses and bowel sounds normal   (female): normal female external genitalia, normal " "urethral meatus, vaginal mucosa pink, moist, well rugated, and normal cervix/adnexa/uterus without masses or discharge  MS: no gross musculoskeletal defects noted, no edema  SKIN: no suspicious lesions or rashes  NEURO: Normal strength and tone, mentation intact and speech normal  PSYCH: mentation appears normal, affect normal/bright    Diagnostic Test Results:  Labs reviewed in Epic    ASSESSMENT/PLAN:   1. Routine general medical examination at a health care facility     - Pap imaged thin layer screen with HPV - recommended age 30 - 65 years (select HPV order below)  - HPV High Risk Types DNA Cervical    2. Hypothyroidism due to acquired atrophy of thyroid     - levothyroxine (SYNTHROID/LEVOTHROID) 75 MCG tablet; Take 1 tablet (75 mcg) by mouth daily  Dispense: 90 tablet; Refill: 3  - TSH with free T4 reflex    3. Paresthesias  Labs to rule our deficiency    - Vitamin B12  - Vitamin D Deficiency  - Iron and iron binding capacity  - CBC with platelets    Will work on mood  Check in with me in 4-6 weeks    Will also consider the whole 30 for dietary guidance and follow weight    Patient has been advised of split billing requirements and indicates understanding: Yes  COUNSELING:  Reviewed preventive health counseling, as reflected in patient instructions       Regular exercise       Healthy diet/nutrition    Estimated body mass index is 18.12 kg/m  as calculated from the following:    Height as of this encounter: 1.689 m (5' 6.5\").    Weight as of this encounter: 51.7 kg (114 lb).        She reports that she has never smoked. She has never used smokeless tobacco.      Counseling Resources:  ATP IV Guidelines  Pooled Cohorts Equation Calculator  Breast Cancer Risk Calculator  BRCA-Related Cancer Risk Assessment: FHS-7 Tool  FRAX Risk Assessment  ICSI Preventive Guidelines  Dietary Guidelines for Americans, 2010  USDA's MyPlate  ASA Prophylaxis  Lung CA Screening    Anna Marie Willis MD  [facility] " [place]

## 2021-05-18 NOTE — PATIENT INSTRUCTIONS
Preventive Health Recommendations  Female Ages 26 - 39  Yearly exam:   See your health care provider every year in order to    Review health changes.     Discuss preventive care.      Review your medicines if you your doctor has prescribed any.    Until age 30: Get a Pap test every three years (more often if you have had an abnormal result).    After age 30: Talk to your doctor about whether you should have a Pap test every 3 years or have a Pap test with HPV screening every 5 years.   You do not need a Pap test if your uterus was removed (hysterectomy) and you have not had cancer.  You should be tested each year for STDs (sexually transmitted diseases), if you're at risk.   Talk to your provider about how often to have your cholesterol checked.  If you are at risk for diabetes, you should have a diabetes test (fasting glucose).  Shots: Get a flu shot each year. Get a tetanus shot every 10 years.   Nutrition:     Eat at least 5 servings of fruits and vegetables each day.    Eat whole-grain bread, whole-wheat pasta and brown rice instead of white grains and rice.    Get adequate Calcium and Vitamin D.     Lifestyle    Exercise at least 150 minutes a week (30 minutes a day, 5 days of the week). This will help you control your weight and prevent disease.    Limit alcohol to one drink per day.    No smoking.     Wear sunscreen to prevent skin cancer.    See your dentist every six months for an exam and cleaning.  See how you feel after boosting exercise - stretching yoga at home core work helps.    If not feeling better in 4-6 weeks please let me know    Consider The Whole 30 as a book to guide your diet

## 2021-05-20 ENCOUNTER — OFFICE VISIT (OUTPATIENT)
Dept: FAMILY MEDICINE | Facility: CLINIC | Age: 36
End: 2021-05-20
Payer: COMMERCIAL

## 2021-05-20 VITALS
OXYGEN SATURATION: 100 % | WEIGHT: 114 LBS | SYSTOLIC BLOOD PRESSURE: 125 MMHG | HEIGHT: 67 IN | DIASTOLIC BLOOD PRESSURE: 88 MMHG | BODY MASS INDEX: 17.89 KG/M2 | TEMPERATURE: 98.9 F | HEART RATE: 89 BPM | RESPIRATION RATE: 16 BRPM

## 2021-05-20 DIAGNOSIS — Z00.00 ROUTINE GENERAL MEDICAL EXAMINATION AT A HEALTH CARE FACILITY: Primary | ICD-10-CM

## 2021-05-20 DIAGNOSIS — R20.2 PARESTHESIAS: ICD-10-CM

## 2021-05-20 DIAGNOSIS — E03.4 HYPOTHYROIDISM DUE TO ACQUIRED ATROPHY OF THYROID: ICD-10-CM

## 2021-05-20 LAB
DEPRECATED CALCIDIOL+CALCIFEROL SERPL-MC: 37 UG/L (ref 20–75)
ERYTHROCYTE [DISTWIDTH] IN BLOOD BY AUTOMATED COUNT: 12.8 % (ref 10–15)
HCT VFR BLD AUTO: 40.8 % (ref 35–47)
HGB BLD-MCNC: 13.2 G/DL (ref 11.7–15.7)
IRON SATN MFR SERPL: 46 % (ref 15–46)
IRON SERPL-MCNC: 122 UG/DL (ref 35–180)
MCH RBC QN AUTO: 31.1 PG (ref 26.5–33)
MCHC RBC AUTO-ENTMCNC: 32.4 G/DL (ref 31.5–36.5)
MCV RBC AUTO: 96 FL (ref 78–100)
PLATELET # BLD AUTO: 224 10E9/L (ref 150–450)
RBC # BLD AUTO: 4.24 10E12/L (ref 3.8–5.2)
TIBC SERPL-MCNC: 268 UG/DL (ref 240–430)
TSH SERPL DL<=0.005 MIU/L-ACNC: 2.05 MU/L (ref 0.4–4)
VIT B12 SERPL-MCNC: 254 PG/ML (ref 193–986)
WBC # BLD AUTO: 7.9 10E9/L (ref 4–11)

## 2021-05-20 PROCEDURE — G0145 SCR C/V CYTO,THINLAYER,RESCR: HCPCS | Performed by: FAMILY MEDICINE

## 2021-05-20 PROCEDURE — 85027 COMPLETE CBC AUTOMATED: CPT | Performed by: FAMILY MEDICINE

## 2021-05-20 PROCEDURE — 87624 HPV HI-RISK TYP POOLED RSLT: CPT | Performed by: FAMILY MEDICINE

## 2021-05-20 PROCEDURE — 83540 ASSAY OF IRON: CPT | Performed by: FAMILY MEDICINE

## 2021-05-20 PROCEDURE — 84443 ASSAY THYROID STIM HORMONE: CPT | Performed by: FAMILY MEDICINE

## 2021-05-20 PROCEDURE — 83550 IRON BINDING TEST: CPT | Performed by: FAMILY MEDICINE

## 2021-05-20 PROCEDURE — 82306 VITAMIN D 25 HYDROXY: CPT | Performed by: FAMILY MEDICINE

## 2021-05-20 PROCEDURE — 99395 PREV VISIT EST AGE 18-39: CPT | Performed by: FAMILY MEDICINE

## 2021-05-20 PROCEDURE — 82607 VITAMIN B-12: CPT | Performed by: FAMILY MEDICINE

## 2021-05-20 PROCEDURE — 99213 OFFICE O/P EST LOW 20 MIN: CPT | Mod: 25 | Performed by: FAMILY MEDICINE

## 2021-05-20 PROCEDURE — 36415 COLL VENOUS BLD VENIPUNCTURE: CPT | Performed by: FAMILY MEDICINE

## 2021-05-20 RX ORDER — LEVOTHYROXINE SODIUM 75 UG/1
75 TABLET ORAL DAILY
Qty: 90 TABLET | Refills: 3 | Status: SHIPPED | OUTPATIENT
Start: 2021-05-20 | End: 2022-05-17

## 2021-05-20 ASSESSMENT — MIFFLIN-ST. JEOR: SCORE: 1231.79

## 2021-05-20 NOTE — NURSING NOTE
"Chief Complaint   Patient presents with     Physical     initial BP (!) 142/86 (BP Location: Left arm, Cuff Size: Adult Regular)   Pulse 89   Temp 98.9  F (37.2  C) (Tympanic)   Resp 16   Ht 1.689 m (5' 6.5\")   Wt 51.7 kg (114 lb)   SpO2 100%   BMI 18.12 kg/m   Estimated body mass index is 18.12 kg/m  as calculated from the following:    Height as of this encounter: 1.689 m (5' 6.5\").    Weight as of this encounter: 51.7 kg (114 lb).  BP completed using cuff size: regular.  L  arm      Health Maintenance that is potentially due pending provider review:  NONE    n/a    Alec Estrada ma  "

## 2021-05-24 ENCOUNTER — MYC MEDICAL ADVICE (OUTPATIENT)
Dept: FAMILY MEDICINE | Facility: CLINIC | Age: 36
End: 2021-05-24

## 2021-05-24 NOTE — RESULT ENCOUNTER NOTE
Hello,    Great news, your results were normal.  The vitamin D was excellent and as long as this is between 35 and 50 it is perfect.  The thyroid level was perfect and the iron levels were at goal.  The B12 level was a little low I do think taking oral B12 every day at 1000 mg is the best step and rechecking your levels in 2 months to see if you are absorbing this appropriately would help confirm if you have difficulty with intake or if it is absorption.  If you have already been taking B12 daily then the next that would be a B12 injection once a month.    Let me know if you have any questions    Anna Marie Willis MD

## 2021-05-25 LAB
COPATH REPORT: NORMAL
PAP: NORMAL

## 2021-05-27 LAB
FINAL DIAGNOSIS: NORMAL
HPV HR 12 DNA CVX QL NAA+PROBE: NEGATIVE
HPV16 DNA SPEC QL NAA+PROBE: NEGATIVE
HPV18 DNA SPEC QL NAA+PROBE: NEGATIVE
SPECIMEN DESCRIPTION: NORMAL
SPECIMEN SOURCE CVX/VAG CYTO: NORMAL

## 2021-06-07 ENCOUNTER — MYC MEDICAL ADVICE (OUTPATIENT)
Dept: FAMILY MEDICINE | Facility: CLINIC | Age: 36
End: 2021-06-07

## 2021-07-16 ENCOUNTER — OFFICE VISIT (OUTPATIENT)
Dept: PODIATRY | Facility: CLINIC | Age: 36
End: 2021-07-16
Payer: COMMERCIAL

## 2021-07-16 VITALS
SYSTOLIC BLOOD PRESSURE: 100 MMHG | BODY MASS INDEX: 18.74 KG/M2 | DIASTOLIC BLOOD PRESSURE: 60 MMHG | WEIGHT: 119.4 LBS | HEIGHT: 67 IN

## 2021-07-16 DIAGNOSIS — B07.0 PLANTAR WART OF RIGHT FOOT: Primary | ICD-10-CM

## 2021-07-16 DIAGNOSIS — L85.9 HYPERKERATOSIS: ICD-10-CM

## 2021-07-16 PROCEDURE — 99203 OFFICE O/P NEW LOW 30 MIN: CPT | Performed by: PODIATRIST

## 2021-07-16 ASSESSMENT — MIFFLIN-ST. JEOR: SCORE: 1256.28

## 2021-07-16 NOTE — PATIENT INSTRUCTIONS
Thank you for choosing [facility]!    DR. WALTON'S CLINIC LOCATIONS  [facility]   [id number] [facility] [address]   [place] [address]                         [place] [address] [phone]       SET UP SURGERY: [phone]    APPOINTMENTS: [phone]    BILLING QUESTIONS: [phone]    FAX NUMBER: [phone]      CALLUS / CORN / IPK CARE  When there is excessive friction or pressure on the skin, the body responds by making the skin thicker to protect the deeper structures from becoming exposed. While this works well to protect the deeper structures, the thickened skin can cause increased pressure and pain.    Callus: flat, diffuse thickened areas are simple calluses and they are usually caused by friction. Often these are the result of rubbing on a shoe or from going barefoot.    Corns: calluses with a central core on or between the toes are called corns. These result from prominent joints on toes rubbing together. Theses are a symptom of bone malalignment or illfitting shoes and will always recur unless the underlying bones are addressed surgically.    IPK: calluses with a central core on the ball of the foot are usually IPKs (intractable plantar keratosis). These are caused by excessive pressure from the metatarsals, the bones that make up the ball of the foot. Often one of these bones is too long or too prominent. Again, these will always recur unless the underlying bone issue is addressed. There is no cure for these. They will either go away by themselves, recur, or more could develop.    TREATMENT RECOMENDATIONS  - File: Trim them down with a pumice stone or callus file a couple times a week to remove callus tissue that builds up. An electric callus removing device. Amope Pedi Perfect Electronic Pedicure Foot File and Callus  Remover can be a good option.   - Moisturize: Lotion can be applied to soften the callus. A lactic acid or urea based cream such as Carmol, Kersal or Vanicream or thicker cream with shea butter are good options.   - Foot Gear: Good supportive shoes and minimizing barefoot walking can slow down callus formation and can decrease pain levels. Gel inserts can also provide padding to the bottom of the foot to prevent pain and slow recurrence. Toe spacers, toe covers, can custom orthotic inserts can be beneficial as well.  - Surgery: If there is a surgical pathology noted, such as a prominent bone, often this needs to be addressed surgically to minimize recurrence. However, sometimes the lesion simply migrates to another spot after surgery, so it is not a guaranteed cure.      OVER THE COUNTER INSERTS    Most of these can be found at your local [employer], [employer], or online:    SuperFeet   Sofsole Fit Spenco   Power Step   Walk-Fit ([employer])  *For heel pain* Arch Cradles  *For heel pain*       ** A good high quality over the counter insert should cost around $40-$50    ** Capsulitis/Metarasalgia - try adding a metatarsal pad on your inserts or find an insert with one built in

## 2021-07-16 NOTE — PROGRESS NOTES
"Foot & Ankle Surgery  July 16, 2021    CC: \" Sore feet, and in specific spots\"    I was asked to see Fara Darling regarding the chief complaint by: Self    HPI:  Pt is a 36 year old female who presents with above complaint.  6-month history of above issue.  She states she was having generalized diffuse foot pain.  She switched to comfortable shoes rather than her \"acute shoes\" and much of her pain is improved.  She does though have multiple spots, including 3 along the plantar left foot, and about 3 along the plantar right foot, that are painful.  \"Aching, and occasional shooting pain\".  She would like \"a planet rib pain\".  Pain 6 out of 10 \"often\", worse with \"flat shoes\".  She has worn supportive shoes which has improved symptoms.  She thinks she may have a wart on the right foot.  She tried duct tape and when the lesion has not gone away, it does not bother her much anymore    ROS:   Pos for CC.  The patient denies current nausea, vomiting, chills, fevers, belly pain, calf pain, chest pain or SOB.  Complete remainder of ROS is otherwise neg.    VITALS:    Vitals:    07/16/21 0955   BP: 100/60   Weight: 54.2 kg (119 lb 6.4 oz)   Height: 1.689 m (5' 6.5\")       PMH: Negative for diabetes, anxiety    SXHX: [place] teeth extraction 2003    MEDS:    Current Outpatient Medications   Medication     adapalene (DIFFERIN) 0.1 % external cream     dapsone (ACZONE) 7.5 % gel     levothyroxine (SYNTHROID/LEVOTHROID) 75 MCG tablet     Current Facility-Administered Medications   Medication     lidocaine 1% with EPINEPHrine 1:100,000 injection 3 mL       ALL:   No Known Allergies    FMH:    Family History   Problem Relation Age of Onset     Hypertension Father      Cancer Maternal Grandfather      Diabetes Maternal Grandfather      Skin Cancer Paternal Grandfather      Hypertension Sister        SocHx:    Social History     Socioeconomic History     Marital status:      Spouse name: Not on file     Number of " children: Not on file     Years of education: Not on file     Highest education level: Not on file   Occupational History     Not on file   Tobacco Use     Smoking status: Never Smoker     Smokeless tobacco: Never Used   Substance and Sexual Activity     Alcohol use: Yes     Drug use: No     Sexual activity: Yes     Partners: Male     Birth control/protection: Condom   Other Topics Concern     Parent/sibling w/ CABG, MI or angioplasty before 65F 55M? Not Asked   Social History Narrative     Not on file     Social Determinants of Health     Financial Resource Strain:      Difficulty of Paying Living Expenses:    Food Insecurity:      Worried About Running Out of Food in the Last Year:      Ran Out of Food in the Last Year:    Transportation Needs:      Lack of Transportation (Medical):      Lack of Transportation (Non-Medical):    Physical Activity:      Days of Exercise per Week:      Minutes of Exercise per Session:    Stress:      Feeling of Stress :    Social Connections:      Frequency of Communication with Friends and Family:      Frequency of Social Gatherings with Friends and Family:      Attends [employer] Services:      Active Member of Clubs or Organizations:      Attends Club or Organization Meetings:      Marital Status:    Intimate Partner Violence:      Fear of Current or Ex-Partner:      Emotionally Abused:      Physically Abused:      Sexually Abused:            EXAMINATION:  Gen:   No apparent distress  Neuro:   A&Ox3, no deficits  Psych:    Answering questions appropriately for age and situation with normal affect  Head:    NCAT  Eye:    Visual scanning without deficit  Ear:    Response to auditory stimuli wnl  Lung:    Non-labored breathing on RA noted  Abd:    NTND per patient report  Lymph:    Neg for pitting/non-pitting edema BLE  Vasc:    Pulses palpable, CFT minimally delayed  Neuro:    Light touch sensation intact to all sensory nerve distributions without paresthesias  Derm:    3 nucleated  hyperkeratotic lesions plantar left foot, including the heel, the arch, and the great toe.  2 small lesions along the right foot, including the great toe and heel.  She does have a skin lesion at the plantar right first MPJ with divergent skin lines and peppered appearance consistent with a plantar wart  MSK:    ROM, strength wnl without limitation, no pain on palpation noted.  Calf:    Neg for redness, swelling or tenderness    Assessment:  36 year old female with painful nucleated hyperkeratotic lesions x5 total; plantar wart right foot      Plan:  Discussed etiologies, anatomy and options  1.  Painful nucleated hyperkeratotic lesions x5 total bilateral foot  -I personally reviewed the patient's lower extremity history pertinent to today's visit, including imaging/labs, in preparation for initiating a treatment program.  -We discussed that many hyper-keratotic lesions are caused by pressure or shearing forces on the skin, and these can often be treated sufficiently with shoes, inserts and local tissue debridement.  Some lesions, however, can have a deep core, and while home treatments are helpful, occasional clinical debridement may be necessary.  In certain cases, surgical excision may be required if no other treatments have proven sufficient.  -Our home instruction handout was dispensed, detailing home therapies to minimize regrowth of the hyperkeratotic tissue.    -we discussed a rough estimate of the cost of debridement in clinic, and how insurance may not cover the cost meaning the patient may be responsible.    -Debrided x5 with a 15 blade without incident, no charge    2.  Plantar wart right foot  -discussed that plantar warts are very persistent, and are tough to eradicate even with aggressive treatments.  Will continue with every-2-weeks clinic treatment and daily home therapies until resolution of wart or until current plan is no longer sufficient, and will then consider Dermatology referral  -She will  continue home therapies and otherwise monitor for now.  Consider to clinic medications and to home prescriptions if above plan fails to provide sufficient relief    Follow up: As needed or sooner with acute issues      Patient's medical history was reviewed today      Jack Bower DPM FACFAS FACFAOM  Podiatric Foot & Ankle Surgeon  [facility]  [phone]    Disclaimer: This note consists of symbols derived from keyboarding, dictation and/or voice recognition software. As a result, there may be errors in the script that have gone undetected. Please consider this when interpreting information found in this chart.

## 2021-09-26 ENCOUNTER — HEALTH MAINTENANCE LETTER (OUTPATIENT)
Age: 36
End: 2021-09-26

## 2022-05-18 ENCOUNTER — HOSPITAL ENCOUNTER (EMERGENCY)
Facility: CLINIC | Age: 37
Discharge: HOME | End: 2022-05-18
Attending: EMERGENCY MEDICINE | Admitting: EMERGENCY MEDICINE
Payer: COMMERCIAL

## 2022-05-18 VITALS
HEIGHT: 67 IN | BODY MASS INDEX: 19.3 KG/M2 | TEMPERATURE: 99.4 F | HEART RATE: 114 BPM | RESPIRATION RATE: 18 BRPM | OXYGEN SATURATION: 100 % | DIASTOLIC BLOOD PRESSURE: 67 MMHG | WEIGHT: 123 LBS | SYSTOLIC BLOOD PRESSURE: 104 MMHG

## 2022-05-18 DIAGNOSIS — B34.9 VIRAL SYNDROME: ICD-10-CM

## 2022-05-18 DIAGNOSIS — R11.2 NON-INTRACTABLE VOMITING WITH NAUSEA, UNSPECIFIED VOMITING TYPE: ICD-10-CM

## 2022-05-18 LAB
ALBUMIN SERPL-MCNC: 3.9 G/DL (ref 3.4–5)
ALBUMIN UR-MCNC: 10 MG/DL
ALP SERPL-CCNC: 51 U/L (ref 40–150)
ALT SERPL W P-5'-P-CCNC: 24 U/L (ref 0–50)
ANION GAP SERPL CALCULATED.3IONS-SCNC: 7 MMOL/L (ref 3–14)
APPEARANCE UR: CLEAR
AST SERPL W P-5'-P-CCNC: 15 U/L (ref 0–45)
B-HCG SERPL-ACNC: <1 IU/L (ref 0–5)
BASOPHILS # BLD AUTO: 0 10E3/UL (ref 0–0.2)
BASOPHILS NFR BLD AUTO: 0 %
BILIRUB SERPL-MCNC: 0.9 MG/DL (ref 0.2–1.3)
BILIRUB UR QL STRIP: NEGATIVE
BUN SERPL-MCNC: 13 MG/DL (ref 7–30)
CALCIUM SERPL-MCNC: 9.1 MG/DL (ref 8.5–10.1)
CHLORIDE BLD-SCNC: 107 MMOL/L (ref 94–109)
CO2 SERPL-SCNC: 25 MMOL/L (ref 20–32)
COLOR UR AUTO: YELLOW
CREAT SERPL-MCNC: 0.55 MG/DL (ref 0.52–1.04)
EOSINOPHIL # BLD AUTO: 0 10E3/UL (ref 0–0.7)
EOSINOPHIL NFR BLD AUTO: 0 %
ERYTHROCYTE [DISTWIDTH] IN BLOOD BY AUTOMATED COUNT: 11.9 % (ref 10–15)
GFR SERPL CREATININE-BSD FRML MDRD: >90 ML/MIN/1.73M2
GLUCOSE BLD-MCNC: 127 MG/DL (ref 70–99)
GLUCOSE UR STRIP-MCNC: NEGATIVE MG/DL
HCT VFR BLD AUTO: 39.3 % (ref 35–47)
HGB BLD-MCNC: 13.6 G/DL (ref 11.7–15.7)
HGB UR QL STRIP: NEGATIVE
IMM GRANULOCYTES # BLD: 0.1 10E3/UL
IMM GRANULOCYTES NFR BLD: 0 %
KETONES UR STRIP-MCNC: 150 MG/DL
LEUKOCYTE ESTERASE UR QL STRIP: NEGATIVE
LIPASE SERPL-CCNC: 88 U/L (ref 73–393)
LYMPHOCYTES # BLD AUTO: 0.7 10E3/UL (ref 0.8–5.3)
LYMPHOCYTES NFR BLD AUTO: 6 %
MCH RBC QN AUTO: 32.2 PG (ref 26.5–33)
MCHC RBC AUTO-ENTMCNC: 34.6 G/DL (ref 31.5–36.5)
MCV RBC AUTO: 93 FL (ref 78–100)
MONOCYTES # BLD AUTO: 0.3 10E3/UL (ref 0–1.3)
MONOCYTES NFR BLD AUTO: 3 %
MUCOUS THREADS #/AREA URNS LPF: PRESENT /LPF
NEUTROPHILS # BLD AUTO: 10.9 10E3/UL (ref 1.6–8.3)
NEUTROPHILS NFR BLD AUTO: 91 %
NITRATE UR QL: NEGATIVE
NRBC # BLD AUTO: 0 10E3/UL
NRBC BLD AUTO-RTO: 0 /100
PH UR STRIP: 6.5 [PH] (ref 5–7)
PLATELET # BLD AUTO: 259 10E3/UL (ref 150–450)
POTASSIUM BLD-SCNC: 3.6 MMOL/L (ref 3.4–5.3)
PROT SERPL-MCNC: 7.3 G/DL (ref 6.8–8.8)
RBC # BLD AUTO: 4.22 10E6/UL (ref 3.8–5.2)
RBC URINE: 1 /HPF
SODIUM SERPL-SCNC: 139 MMOL/L (ref 133–144)
SP GR UR STRIP: 1.03 (ref 1–1.03)
SQUAMOUS EPITHELIAL: <1 /HPF
TROPONIN I SERPL HS-MCNC: <3 NG/L
UROBILINOGEN UR STRIP-MCNC: NORMAL MG/DL
WBC # BLD AUTO: 12 10E3/UL (ref 4–11)
WBC URINE: 1 /HPF

## 2022-05-18 PROCEDURE — 250N000011 HC RX IP 250 OP 636: Performed by: EMERGENCY MEDICINE

## 2022-05-18 PROCEDURE — 84702 CHORIONIC GONADOTROPIN TEST: CPT | Performed by: EMERGENCY MEDICINE

## 2022-05-18 PROCEDURE — 96374 THER/PROPH/DIAG INJ IV PUSH: CPT

## 2022-05-18 PROCEDURE — 83690 ASSAY OF LIPASE: CPT | Performed by: EMERGENCY MEDICINE

## 2022-05-18 PROCEDURE — 258N000003 HC RX IP 258 OP 636: Performed by: EMERGENCY MEDICINE

## 2022-05-18 PROCEDURE — 84484 ASSAY OF TROPONIN QUANT: CPT | Performed by: EMERGENCY MEDICINE

## 2022-05-18 PROCEDURE — 80053 COMPREHEN METABOLIC PANEL: CPT | Performed by: EMERGENCY MEDICINE

## 2022-05-18 PROCEDURE — 99284 EMERGENCY DEPT VISIT MOD MDM: CPT | Mod: 25

## 2022-05-18 PROCEDURE — 85025 COMPLETE CBC W/AUTO DIFF WBC: CPT | Performed by: EMERGENCY MEDICINE

## 2022-05-18 PROCEDURE — 96361 HYDRATE IV INFUSION ADD-ON: CPT

## 2022-05-18 PROCEDURE — 96376 TX/PRO/DX INJ SAME DRUG ADON: CPT

## 2022-05-18 PROCEDURE — 81001 URINALYSIS AUTO W/SCOPE: CPT | Performed by: EMERGENCY MEDICINE

## 2022-05-18 PROCEDURE — 36415 COLL VENOUS BLD VENIPUNCTURE: CPT | Performed by: EMERGENCY MEDICINE

## 2022-05-18 PROCEDURE — 250N000013 HC RX MED GY IP 250 OP 250 PS 637: Performed by: EMERGENCY MEDICINE

## 2022-05-18 RX ORDER — ONDANSETRON 2 MG/ML
4 INJECTION INTRAMUSCULAR; INTRAVENOUS ONCE
Status: COMPLETED | OUTPATIENT
Start: 2022-05-18 | End: 2022-05-18

## 2022-05-18 RX ORDER — IBUPROFEN 600 MG/1
600 TABLET, FILM COATED ORAL ONCE
Status: COMPLETED | OUTPATIENT
Start: 2022-05-18 | End: 2022-05-18

## 2022-05-18 RX ORDER — ONDANSETRON 2 MG/ML
4 INJECTION INTRAMUSCULAR; INTRAVENOUS EVERY 30 MIN PRN
Status: DISCONTINUED | OUTPATIENT
Start: 2022-05-18 | End: 2022-05-18 | Stop reason: HOSPADM

## 2022-05-18 RX ORDER — ACETAMINOPHEN 500 MG
1000 TABLET ORAL ONCE
Status: COMPLETED | OUTPATIENT
Start: 2022-05-18 | End: 2022-05-18

## 2022-05-18 RX ORDER — ONDANSETRON 4 MG/1
4 TABLET, ORALLY DISINTEGRATING ORAL EVERY 8 HOURS PRN
Qty: 10 TABLET | Refills: 0 | Status: SHIPPED | OUTPATIENT
Start: 2022-05-18 | End: 2022-05-21

## 2022-05-18 RX ADMIN — ONDANSETRON 4 MG: 2 INJECTION INTRAMUSCULAR; INTRAVENOUS at 14:19

## 2022-05-18 RX ADMIN — SODIUM CHLORIDE 1000 ML: 9 INJECTION, SOLUTION INTRAVENOUS at 14:18

## 2022-05-18 RX ADMIN — SODIUM CHLORIDE 1000 ML: 9 INJECTION, SOLUTION INTRAVENOUS at 18:32

## 2022-05-18 RX ADMIN — ONDANSETRON 4 MG: 2 INJECTION INTRAMUSCULAR; INTRAVENOUS at 18:44

## 2022-05-18 RX ADMIN — ACETAMINOPHEN 1000 MG: 500 TABLET, FILM COATED ORAL at 18:43

## 2022-05-18 RX ADMIN — IBUPROFEN 600 MG: 600 TABLET ORAL at 18:43

## 2022-05-18 NOTE — ED PROVIDER NOTES
History     Chief Complaint:  Vomiting       HPI   Fara Darling is a 37 year old female who presents with an upset stomach and vomiting that began early hours of this morning.  She says that she has had a soft and mild headache for the last several days stemming from a road trip to [place], however the headache was not something she was really pain attention to given how mild it was.  This morning she began to have some vomiting, roughly every hour, and the headache got to be a little bit worse later in the morning, and has been waxing and waning throughout the day.  No mental status changes, no diarrhea, patient is a  and does note that the stomach flu is going around.    Endorses intermittent lower abdominal and periumbilical pain as well along with her nausea and vomiting.  She says she currently does not have any abdominal pain, just feels queasy.  Denies any dysuria, states that she is not urinating very much today, and no vaginal bleeding.    ROS:  Review of Systems   All other systems reviewed and are negative.       Allergies:  No Known Allergies     Medications:    adapalene (DIFFERIN) 0.1 % external cream  dapsone (ACZONE) 7.5 % gel  levothyroxine (SYNTHROID/LEVOTHROID) 75 MCG tablet        Past Medical History:    No past medical history on file.    Past Surgical History:    No past surgical history on file.     Family History:    family history includes Cancer in her maternal grandfather; Diabetes in her maternal grandfather; Hypertension in her father and sister; Skin Cancer in her paternal grandfather.    Social History:   reports that she has never smoked. She has never used smokeless tobacco. She reports current alcohol use. She reports that she does not use drugs.  PCP: Anna Marie Willis     Physical Exam   Patient Vitals for the past 24 hrs:   BP Temp Temp src Pulse Resp SpO2 Height Weight   05/18/22 1833 -- 99.4  F (37.4  C) -- -- -- 98 % -- --   05/18/22 1411  "117/56 -- -- -- -- -- -- --   05/18/22 1409 -- 97.9  F (36.6  C) Temporal 114 18 97 % 1.702 m (5' 7\") 55.8 kg (123 lb)        Physical Exam  Vitals: reviewed by me  General: Pt seen on [facility], [facility], cooperative, and alert to conversation  Eyes: Tracking well, clear conjunctiva BL  ENT: MMM, midline trachea.   Lungs: No tachypnea, no accessory muscle use. No respiratory distress.  Lungs are clear to auscultation bilaterally  CV: Rate as above, normal S1-S2, no additional heart sounds heard  Abd: Soft, non tender, no guarding, no rebound. Non distended  Specifically negative Olivo sign.  MSK: no joint effusion.  No evidence of trauma  Skin: No rash  Neuro: Clear speech and no facial droop.  Psych: Not RIS, no e/o AH/VH      Emergency Department Course       Laboratory:  Labs Ordered and Resulted from Time of ED Arrival to Time of ED Departure   COMPREHENSIVE METABOLIC PANEL - Abnormal       Result Value    Sodium 139      Potassium 3.6      Chloride 107      Carbon Dioxide (CO2) 25      Anion Gap 7      Urea Nitrogen 13      Creatinine 0.55      Calcium 9.1      Glucose 127 (*)     Alkaline Phosphatase 51      AST 15      ALT 24      Protein Total 7.3      Albumin 3.9      Bilirubin Total 0.9      GFR Estimate >90     CBC WITH PLATELETS AND DIFFERENTIAL - Abnormal    WBC Count 12.0 (*)     RBC Count 4.22      Hemoglobin 13.6      Hematocrit 39.3      MCV 93      MCH 32.2      MCHC 34.6      RDW 11.9      Platelet Count 259      % Neutrophils 91      % Lymphocytes 6      % Monocytes 3      % Eosinophils 0      % Basophils 0      % Immature Granulocytes 0      NRBCs per 100 WBC 0      Absolute Neutrophils 10.9 (*)     Absolute Lymphocytes 0.7 (*)     Absolute Monocytes 0.3      Absolute Eosinophils 0.0      Absolute Basophils 0.0      Absolute Immature Granulocytes 0.1      Absolute NRBCs 0.0     ROUTINE UA WITH MICROSCOPIC REFLEX TO CULTURE - Abnormal    Color Urine Yellow      Appearance Urine Clear      " Glucose Urine Negative      Bilirubin Urine Negative      Ketones Urine 150  (*)     Specific Gravity Urine 1.026      Blood Urine Negative      pH Urine 6.5      Protein Albumin Urine 10  (*)     Urobilinogen Urine Normal      Nitrite Urine Negative      Leukocyte Esterase Urine Negative      Mucus Urine Present (*)     RBC Urine 1      WBC Urine 1      Squamous Epithelials Urine <1     LIPASE - Normal    Lipase 88     HCG QUANTITATIVE PREGNANCY - Normal    hCG Quantitative <1     TROPONIN I - Normal    Troponin I High Sensitivity <3          Emergency Department Course:      Reviewed:  I reviewed nursing notes, vitals and past medical history    Assessments:  8:26 PM I reassessed the patient, repeated my abdominal exam, she continues to have no tenderness guarding or rebound for orders.  She is tolerating orals including water and crackers, and her coworker is now in the room also endorsing many cases of stomach flu at work recently.  Patient tells me her headache is also improved at this time        Interventions:  Medications   0.9% sodium chloride BOLUS (1,000 mLs Intravenous New Bag 5/18/22 1832)   ondansetron (ZOFRAN) injection 4 mg (4 mg Intravenous Given 5/18/22 1419)   0.9% sodium chloride BOLUS (1,000 mLs Intravenous New Bag 5/18/22 1418)        Disposition:  The patient was discharged to home.     Impression & Plan        Medical Decision Making:  This is a pleasant 37-year-old female who presents emergency room with appears to be a viral syndrome, possibly stomach flu.  It sounds like she has had increasing mild headaches over the last several weeks even, and no thunderclap component or neck stiffness or change in her baseline headaches to evidence meningitis.  She also has a robust amount of vomiting today, essentially countless times she tells me, and some abdominal discomfort as well.  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  Her abdomen is benign on multiple assessments, she is tolerating orals, I do not think that she needs a CT scan at this time.  I do not think that this is appendicitis, her urinalysis shows no evidence of UTI, and I do not think that this is ovarian pathology since she does not have any lower abdominal tenderness.  We will plan for supportive care, Zofran, and discharge home.  Red flags for when to come back to the ER were discussed in detail as well as how she needs to follow-up with her regular doctor in the next 3 to 5 days.    Diagnosis:    ICD-10-CM    1. Non-intractable vomiting with nausea, unspecified vomiting type  R11.2    2. Viral syndrome  B34.9         Discharge Medications:  Discharge Medication List as of 5/18/2022  8:59 PM      START taking these medications    Details   ondansetron (ZOFRAN ODT) 4 MG ODT tab Take 1 tablet (4 mg) by mouth every 8 hours as needed, Disp-10 tablet, R-0, Local Print              5/18/2022   Gonzales Sena*        Gonzales Sena MD  05/19/22 0058

## 2022-05-19 NOTE — DISCHARGE INSTRUCTIONS
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  Please take the Zofran to help with any nausea you have, and come back with any residual vomiting or headache that persists after taking Tylenol and Motrin.  Come back also with any new abdominal pain, or with any new concerns or if you go a whole day without urinating.  Please come back with any other concerns, and do follow with your regular doctor in the next 3 to 5 days for a checkup.  Come back with any other symptoms you develop.

## 2022-07-02 ENCOUNTER — HEALTH MAINTENANCE LETTER (OUTPATIENT)
Age: 37
End: 2022-07-02

## 2023-04-23 ENCOUNTER — HEALTH MAINTENANCE LETTER (OUTPATIENT)
Age: 38
End: 2023-04-23

## 2023-07-15 ENCOUNTER — HEALTH MAINTENANCE LETTER (OUTPATIENT)
Age: 38
End: 2023-07-15

## 2024-09-07 ENCOUNTER — HEALTH MAINTENANCE LETTER (OUTPATIENT)
Age: 39
End: 2024-09-07

## 2025-03-08 ENCOUNTER — HEALTH MAINTENANCE LETTER (OUTPATIENT)
Age: 40
End: 2025-03-08